# Patient Record
Sex: MALE | Race: OTHER | HISPANIC OR LATINO | Employment: OTHER | URBAN - METROPOLITAN AREA
[De-identification: names, ages, dates, MRNs, and addresses within clinical notes are randomized per-mention and may not be internally consistent; named-entity substitution may affect disease eponyms.]

---

## 2023-04-25 ENCOUNTER — OFFICE VISIT (OUTPATIENT)
Dept: URGENT CARE | Facility: CLINIC | Age: 48
End: 2023-04-25
Payer: COMMERCIAL

## 2023-04-25 VITALS
RESPIRATION RATE: 18 BRPM | DIASTOLIC BLOOD PRESSURE: 85 MMHG | TEMPERATURE: 98 F | BODY MASS INDEX: 31.7 KG/M2 | OXYGEN SATURATION: 98 % | WEIGHT: 214 LBS | HEIGHT: 69 IN | HEART RATE: 82 BPM | SYSTOLIC BLOOD PRESSURE: 132 MMHG

## 2023-04-25 DIAGNOSIS — R05.9 COUGH, UNSPECIFIED TYPE: Primary | ICD-10-CM

## 2023-04-25 PROCEDURE — 71046 XR CHEST PA AND LATERAL: ICD-10-PCS | Mod: S$GLB,,, | Performed by: RADIOLOGY

## 2023-04-25 PROCEDURE — 99203 OFFICE O/P NEW LOW 30 MIN: CPT | Mod: S$GLB,,, | Performed by: FAMILY MEDICINE

## 2023-04-25 PROCEDURE — 71046 X-RAY EXAM CHEST 2 VIEWS: CPT | Mod: S$GLB,,, | Performed by: RADIOLOGY

## 2023-04-25 PROCEDURE — 99203 PR OFFICE/OUTPT VISIT, NEW, LEVL III, 30-44 MIN: ICD-10-PCS | Mod: S$GLB,,, | Performed by: FAMILY MEDICINE

## 2023-04-25 RX ORDER — ATORVASTATIN CALCIUM 40 MG/1
40 TABLET, FILM COATED ORAL DAILY
COMMUNITY

## 2023-04-25 RX ORDER — BECLOMETHASONE DIPROPIONATE HFA 40 UG/1
40 AEROSOL, METERED RESPIRATORY (INHALATION) 2 TIMES DAILY
Qty: 10.6 G | Refills: 0 | Status: SHIPPED | OUTPATIENT
Start: 2023-04-25

## 2023-04-25 RX ORDER — ALBUTEROL SULFATE 90 UG/1
2 AEROSOL, METERED RESPIRATORY (INHALATION) EVERY 6 HOURS PRN
Qty: 18 G | Refills: 2 | Status: SHIPPED | OUTPATIENT
Start: 2023-04-25

## 2023-04-25 RX ORDER — PROMETHAZINE HYDROCHLORIDE AND DEXTROMETHORPHAN HYDROBROMIDE 6.25; 15 MG/5ML; MG/5ML
SYRUP ORAL EVERY 6 HOURS PRN
COMMUNITY

## 2023-04-25 RX ORDER — BENZONATATE 100 MG/1
100 CAPSULE ORAL 3 TIMES DAILY PRN
COMMUNITY

## 2023-04-25 RX ORDER — AMLODIPINE BESYLATE 5 MG/1
5 TABLET ORAL DAILY
COMMUNITY

## 2023-04-25 RX ORDER — METHYLPREDNISOLONE 4 MG/1
TABLET ORAL
Qty: 1 EACH | Refills: 0 | Status: SHIPPED | OUTPATIENT
Start: 2023-04-25

## 2023-04-25 NOTE — PROGRESS NOTES
"Subjective:      Patient ID: Mc Tineo is a 47 y.o. male.    Vitals:  height is 5' 9" (1.753 m) and weight is 97.1 kg (214 lb). His temporal temperature is 97.8 °F (36.6 °C). His blood pressure is 132/85 and his pulse is 82. His respiration is 18 and oxygen saturation is 98%.     Chief Complaint: Cough and Headache    Pt. Is present in clinic today c/o cough since November of 2022 he stated that it came and went for a few months but now it more productive than before. He has started taking MUCINEX it helps a little but has concerns about the longevity of his cough he also request an chest x-ray    Cough  This is a recurrent problem. The current episode started more than 1 month ago. The problem has been unchanged. The problem occurs every few minutes. The cough is Productive of sputum. Associated symptoms include chest pain, chills, headaches, nasal congestion, a sore throat, shortness of breath and wheezing. The symptoms are aggravated by cold air. He has tried OTC cough suppressant for the symptoms. The treatment provided mild relief.     Constitution: Positive for chills.   HENT:  Positive for sore throat.    Cardiovascular:  Positive for chest pain.   Respiratory:  Positive for cough, shortness of breath and wheezing.    Neurological:  Positive for headaches.    Objective:     Physical Exam    Physical Exam  Vitals signs and nursing note reviewed.   Constitutional:       Appearance: Pt is well-developed. Alert, NAD.  Pt is cooperative.  Non-toxic appearance.  HENT:      Head: Normocephalic and atraumatic. .      Right Ear: External ear normal.      Left Ear: External ear normal.   Eyes:      General: Lids are normal.      Conjunctiva/sclera: Conjunctivae normal. Visual tracking is normal. Right eye exhibits no exudate. Left eye exhibits no exudate. No scleral icterus.     Pupils: Pupils are equal, round  Neck:      Musculoskeletal: range of motion without pain and neck supple.      Trachea: Trachea and " phonation normal.   Throat: No apparent pharyngeal edema or swelling  Cardiovascular:      Rate and Rhythm: Normal Rhythm. Extremities well perfused.   Pulmonary:      Effort: Pulmonary effort is normal. No respiratory distress. NO stridor or difficulty breathing     Breath sounds: Normal breath sounds.   Abdomen: NO obvious distention.  Musculoskeletal: Normal range of motion. No ambulation issues  Skin:     General: Skin is warm and dry. No open wounds or abrasions. No petechiae No cyanosis  no jaundice not diaphoretic, not pale, not purpuric  Neurological:      Mental Status:Pt is alert and oriented to person, place, and time.   Psychiatric:         Speech: Speech normal.         Behavior: Behavior normal.         Thought Content: Thought content normal.         Judgment: Judgment normal.       Assessment:     1. Cough, unspecified type        Plan:   Patient is from out of town.  Was seen by his primary care doctor in [place].  Was referred to pulmonology but never did follow-up.  Has tried reflux medication which has helped a little bit.    Cough, unspecified type  -     XR CHEST PA AND LATERAL; Future; Expected date: 04/25/2023    Other orders  -     beclomethasone dipropionate (QVAR REDIHALER) 40 mcg/actuation HFAB; Inhale 1 puff into the lungs 2 (two) times daily.  Dispense: 10.6 g; Refill: 0  -     albuterol (PROVENTIL/VENTOLIN HFA) 90 mcg/actuation inhaler; Inhale 2 puffs into the lungs every 6 (six) hours as needed for Wheezing. Rescue  Dispense: 18 g; Refill: 2  -     methylPREDNISolone (MEDROL DOSEPACK) 4 mg tablet; Dispense one magdalena. use as directed  Dispense: 1 each; Refill: 0      XR CHEST PA AND LATERAL    Result Date: 4/25/2023  EXAMINATION: XR CHEST PA AND LATERAL CLINICAL HISTORY: Cough, unspecified TECHNIQUE: PA and lateral views of the chest were performed. COMPARISON: None FINDINGS: The cardiomediastinal silhouette is within normal limits.  The lungs are well expanded without consolidation or  pleural effusion.     Negative chest. Electronically signed by: Javier Verde MD Date:    04/25/2023 Time:    11:02